# Patient Record
Sex: MALE | Race: WHITE | NOT HISPANIC OR LATINO | Employment: FULL TIME | URBAN - METROPOLITAN AREA
[De-identification: names, ages, dates, MRNs, and addresses within clinical notes are randomized per-mention and may not be internally consistent; named-entity substitution may affect disease eponyms.]

---

## 2019-12-10 ENCOUNTER — ANCILLARY PROCEDURE (OUTPATIENT)
Dept: GENERAL RADIOLOGY | Facility: CLINIC | Age: 44
End: 2019-12-10
Attending: PHYSICIAN ASSISTANT
Payer: COMMERCIAL

## 2019-12-10 ENCOUNTER — OFFICE VISIT (OUTPATIENT)
Dept: FAMILY MEDICINE | Facility: CLINIC | Age: 44
End: 2019-12-10
Payer: COMMERCIAL

## 2019-12-10 VITALS
DIASTOLIC BLOOD PRESSURE: 84 MMHG | SYSTOLIC BLOOD PRESSURE: 134 MMHG | HEIGHT: 71 IN | HEART RATE: 72 BPM | TEMPERATURE: 97.3 F | WEIGHT: 293 LBS | RESPIRATION RATE: 16 BRPM | BODY MASS INDEX: 41.02 KG/M2 | OXYGEN SATURATION: 99 %

## 2019-12-10 DIAGNOSIS — M25.511 RIGHT SHOULDER PAIN, UNSPECIFIED CHRONICITY: ICD-10-CM

## 2019-12-10 DIAGNOSIS — Z30.09 VASECTOMY EVALUATION: Primary | ICD-10-CM

## 2019-12-10 PROCEDURE — 73030 X-RAY EXAM OF SHOULDER: CPT | Mod: RT

## 2019-12-10 PROCEDURE — 99203 OFFICE O/P NEW LOW 30 MIN: CPT | Performed by: PHYSICIAN ASSISTANT

## 2019-12-10 ASSESSMENT — MIFFLIN-ST. JEOR: SCORE: 2227.2

## 2019-12-20 ENCOUNTER — THERAPY VISIT (OUTPATIENT)
Dept: PHYSICAL THERAPY | Facility: CLINIC | Age: 44
End: 2019-12-20
Attending: PHYSICIAN ASSISTANT
Payer: COMMERCIAL

## 2019-12-20 DIAGNOSIS — G89.29 CHRONIC RIGHT SHOULDER PAIN: ICD-10-CM

## 2019-12-20 DIAGNOSIS — M25.511 RIGHT SHOULDER PAIN, UNSPECIFIED CHRONICITY: ICD-10-CM

## 2019-12-20 DIAGNOSIS — M25.511 CHRONIC RIGHT SHOULDER PAIN: ICD-10-CM

## 2019-12-20 DIAGNOSIS — M54.12 CERVICAL RADICULOPATHY: ICD-10-CM

## 2019-12-20 PROCEDURE — 97161 PT EVAL LOW COMPLEX 20 MIN: CPT | Mod: GP | Performed by: PHYSICAL THERAPIST

## 2019-12-20 PROCEDURE — 97110 THERAPEUTIC EXERCISES: CPT | Mod: GP | Performed by: PHYSICAL THERAPIST

## 2019-12-20 NOTE — PROGRESS NOTES
[facility] Initial Evaluation  Subjective:  The history is provided by the patient. No  was used.   Gustavo Capps being seen for R shoulder.   Problem began 12/10/2019 (date of referral). Where condition occurred: for unknown reasons.Problem occurred: unknown  and reported as 2/10 on pain scale. General health as reported by patient is good. Pertinent medical history includes:  Overweight, numbness/tingling and sleep disorder/apnea.   Other medical allergies details: penicillin.  Surgeries include:  None.  Current medications:  None.   Primary job tasks include:  Prolonged sitting and computer work.  Pain is described as sharp and is intermittent. Pain is the same all the time. Since onset symptoms are unchanged. Special tests:  X-ray (mild AC DJD).     Patient is . Restrictions include:  Working in normal job without restrictions.    Barriers include:  None as reported by patient.  Red flags:  None as reported by patient.  Type of problem:  Right shoulder   Condition occurred with:  Unknown cause. This is a chronic condition   Problem details: Over a year ago started getting worsening shoulder pain and not sure why.  No injury or change in activity.   Patient reports pain:  Lateral and anterior. Radiates to:  Upper arm. Associated symptoms:  Loss of strength. Symptoms are exacerbated by lifting and certain positions and relieved by rest.                      Objective:  Standing Alignment:    Cervical/Thoracic:  Forward head  Shoulder/UE:  Rounded shoulders                                  Cervical/Thoracic Evaluation  Cervical AROM: normal                       Cervical Stability/Joint Clearing:  Stability/joint clearing spine: (+) Spurling B to (R) for tingling to hand.               Shoulder Evaluation:  ROM:  AROM:    Flexion:  Left:  160    Right:  155    Abduction:  Left: 160   Right:  155      External Rotation:  Left:  80    Right:   80            Extension/Internal Rotation:  Left:  T8    Right:  T10              Special Tests:      Right shoulder positive for the following special tests:Impingement (Neer)  Palpation:      Left shoulder tenderness not present at: Supraspinatus; Infraspinatus; Teres Minor; Subscapularis or Bicipital Groove  Right shoulder tenderness present at: Supraspinatus; Infraspinatus and Bicipital Groove  Right shoulder tenderness not present at:Teres Minor or Subscapularis                                     General     ROS    Assessment/Plan:    Patient is a 44 year old male with cervical and right shoulder complaints.    Patient has the following significant findings with corresponding treatment plan.                Diagnosis 1:  Cervical radiculopathy  Pain -  manual therapy, self management, education and home program  Impaired posture - neuro re-education and home program  Diagnosis 2:  Shoulder pain/impingement   Pain -  manual therapy, self management, education and home program  Decreased ROM/flexibility - manual therapy, therapeutic exercise and home program  Decreased strength - therapeutic exercise, therapeutic activities and home program  Impaired posture - neuro re-education and home program    Cumulative Therapy Evaluation is: Low complexity.    Previous and current functional limitations:  (See Goal Flow Sheet for this information)    Short term and Long term goals: (See Goal Flow Sheet for this information)     Communication ability:  Patient appears to be able to clearly communicate and understand verbal and written communication and follow directions correctly.  Treatment Explanation - The following has been discussed with the patient:   RX ordered/plan of care  Anticipated outcomes  Possible risks and side effects  This patient would benefit from PT intervention to resume normal activities.   Rehab potential is good.    Frequency:  2 X a month, once daily  Duration:  for 2 months  Discharge Plan:   Achieve all LTG.  Independent in home treatment program.  Reach maximal therapeutic benefit.    Please refer to the daily flowsheet for treatment today, total treatment time and time spent performing 1:1 timed codes.

## 2020-03-09 PROBLEM — G89.29 CHRONIC RIGHT SHOULDER PAIN: Status: RESOLVED | Noted: 2019-12-20 | Resolved: 2020-03-09

## 2020-03-09 PROBLEM — M54.12 CERVICAL RADICULOPATHY: Status: RESOLVED | Noted: 2019-12-20 | Resolved: 2020-03-09

## 2020-03-09 PROBLEM — M25.511 CHRONIC RIGHT SHOULDER PAIN: Status: RESOLVED | Noted: 2019-12-20 | Resolved: 2020-03-09

## 2020-07-31 ENCOUNTER — TELEPHONE (OUTPATIENT)
Dept: UROLOGY | Facility: CLINIC | Age: 45
End: 2020-07-31

## 2020-07-31 NOTE — TELEPHONE ENCOUNTER
Reason for call:  Other   Patient called regarding (reason for call): appointment  Additional comments: Please call to discuss upcoming appointment for vasectomy consult. Thank you.    Phone number to reach patient:  Cell number on file:    Telephone Information:   Mobile [phone]       Best Time:  any    Can we leave a detailed message on this number?  YES    Travel screening: Not Applicable

## 2020-08-14 ENCOUNTER — OFFICE VISIT (OUTPATIENT)
Dept: UROLOGY | Facility: CLINIC | Age: 45
End: 2020-08-14
Payer: COMMERCIAL

## 2020-08-14 VITALS — HEART RATE: 87 BPM | OXYGEN SATURATION: 96 % | DIASTOLIC BLOOD PRESSURE: 75 MMHG | SYSTOLIC BLOOD PRESSURE: 112 MMHG

## 2020-08-14 DIAGNOSIS — Z30.09 VASECTOMY EVALUATION: Primary | ICD-10-CM

## 2020-08-14 PROCEDURE — 99202 OFFICE O/P NEW SF 15 MIN: CPT | Performed by: UROLOGY

## 2020-08-14 NOTE — PROGRESS NOTES
Vasectomy Consult    Reason for visit:   Discuss as a method of birth control/sterilization per Slava Arias's request for consultation  He is interested in vasectomy scrotally.  Patient has 2 children and desires sterilization.  Does not want to use condom or having partners on birth control pills.  He has no erections problem.  He has no urinary complaints.    No current outpatient medications on file.     Allergies   Allergen Reactions     Penicillins Anaphylaxis     PN: LW Reaction: Rash, Generalized       No past medical history on file.  No past surgical history on file.   No family history on file.  Social History     Socioeconomic History     Marital status:      Spouse name: None     Number of children: None     Years of education: None     Highest education level: None   Occupational History     None   Social Needs     Financial resource strain: None     Food insecurity     Worry: None     Inability: None     Transportation needs     Medical: None     Non-medical: None   Tobacco Use     Smoking status: Never Smoker     Smokeless tobacco: Never Used   Substance and Sexual Activity     Alcohol use: Yes     Drug use: Not Currently     Sexual activity: Yes     Partners: Female   Lifestyle     Physical activity     Days per week: None     Minutes per session: None     Stress: None   Relationships     Social connections     Talks on phone: None     Gets together: None     Attends [employer] service: None     Active member of club or organization: None     Attends meetings of clubs or organizations: None     Relationship status: None     Intimate partner violence     Fear of current or ex partner: None     Emotionally abused: None     Physically abused: None     Forced sexual activity: None   Other Topics Concern     None   Social History Narrative     None       REVIEW OF SYSTEMS  =================  C: NEGATIVE for fever, chills, change in weight  I: NEGATIVE for worrisome rashes, moles or lesions  E/M:  NEGATIVE for ear, mouth and throat problems  R: NEGATIVE for significant cough or SHORTNESS OF BREATH  CV:  NEGATIVE for chest pain, palpitations or peripheral edema  GI: NEGATIVE for nausea, abdominal pain, heartburn, or change in bowel habits  NEURO: NEGATIVE numbness/weakness  : see HPI  PSYCH: NEGATIVE depression/anxiety  LYmph: no new enlarged lymph nodes  Ortho: no new trauma/movements      Physical Exam:  /75 (BP Location: Right arm, Patient Position: Chair, Cuff Size: Adult Large)   Pulse 87   SpO2 96%    Patient is pleasant, in no acute distress, good general condition.  HEENT:  Normalcephalic, atraumatic  Lung: no evidence of respiratory distress    Abdomen: Soft, nondistended, non tender. No masses. No rebound or guarding.   Exam: penis no d/c testis no masses bilateral vas palpated no scrotal lesion  Skin: Warm and dry.  No redness.  Neuro: grossly normal  Psych normal mood and affect  Musculoskeletal  moving all extremities        Discussed    That vasectomy is permanent method of birth control.    That vasectomy can fail due to recanalization of the vas even many months/years later.    That he needs 2 negative sperm checks to be considered sterile    That there are other methods that are not permanent and also that the sperm can be frozen for later use.    How the technique is performed, risks of infection, bleeding, damage to the testes vessels and testes atrophy    Long term complication such as chronic and difficult to treat testes pain and questionable increase incidence of prostate cancer    That the procedure can be done at the clinic or hospital OR        Plan:    Stop Aspirin  Will schedule Vasectomy in the future

## 2020-08-14 NOTE — PATIENT INSTRUCTIONS
Your Vasectomy is scheduled 10/6/2020 at 8:00.  Please call [phone] if you need to reschedule this appointment or if you have any question.     Preparation for Vasectomy:  Shave the hair away from the base of your penis and around your testicles.  Wear snug underwear the day of the vasectomy to support your testicles.  Do not take aspirin, ibuprofen, advil, motrin, aleve products one week prior to your vasectomy.      General Vasectomy Information    Vasectomy is a surgery.  If it is successful, it will be impossible for you to ever father children.  The following information regards the male sterilization done by an operation called a vasectomy.  This is done in the physician's office.    The operation done to sterilize the male is easier, safer and much less expensive than the operation done to sterilize the woman.    Sterilization should be considered permanent.  For most males, once the operation is done, it can never me undone.  There are attempts made occasionally to reconnect the tubes that have been cut during the procedure, but this is very difficult and expensive and works only about 50-70% of the time.  In order for any of the physicians in our clinic to do a vasectomy, we require that you consider this a permanent form a sterilization.    A vasectomy can be done at any time, but it is best to think about having it done when you can take at least one day off from work and any excess activities.    Your decision to have a vasectomy should only be made with the following facts clear in mind.    1. First, a vasectomy is only one of several means of birth control.  Many form of temporary contraception are available.  If you have any questions about other methods, please discuss this with your physician.    2. A vasectomy may be unsuccessful in approximately one out of 1000 couples per year.  This occurs when the tubes which are cut during the procedure reconnect themselves.  Sterility cannot be  guaranteed.    3. You should be aware that it is the current belief of the medical profession that a vasectomy procedure does not alter a male physically, physiologically or sexually.  Because each person is a unique individual, there is always the possibility of an adverse phychiatric reaction.  This can be best avoided by being very comfortable in your own mind that you want to have this done, and that you do not want to father any children in the future.  If this is not clear in your mind, this should be further discussed with your physician.    4. You will not notice a change in the volume of your ejaculate since sperm is a very small amount of the semen and it is only the sperm that is stopped from entering the ejaculate after a vasectomy.  Your prostate and seminal vesicle glands really supply most of the semen and this is not at all decreased after a vasectomy.  Also there is no effect on the male hormones.    5. You do not become sterile immediately following a vasectomy due to the fact that there is still sperm remaining in your system that must be eliminated by ejaculation.  For this reason, your sexual partner could still become pregnant for a period of time following the vasectomy operation.  It is necessary that contraceptive measures be used until you receive confirmation from your physician that you are sterile.  It takes approximately 12 ejaculations to clear the semen of sperm, but this can differ in different men.  For this reason, it is very important that your semen be checked for sperm before you are considered sterile, and this should be done approximately 12 weeks after your vasectomy.      6. Vasectomy has risks and benefits.  Among the risks are possible complications resulting from the procedure.  These risks include but are not limited to:   A.  Bleeding, infection, or hematoma occuring during or in the recovery period from the procedure.   B.  Sperm granuloma or a small pea to walnut  sized lump which is a collection of scar tissue and sperm in your scrotal sack and remains permanently   C.  There may be an increased risk of prostate cancer although the data is   unclear.

## 2020-10-06 ENCOUNTER — OFFICE VISIT (OUTPATIENT)
Dept: UROLOGY | Facility: CLINIC | Age: 45
End: 2020-10-06
Payer: COMMERCIAL

## 2020-10-06 DIAGNOSIS — Z30.2 ENCOUNTER FOR VASECTOMY: Primary | ICD-10-CM

## 2020-10-06 DIAGNOSIS — N47.5 PENILE ADHESION: ICD-10-CM

## 2020-10-06 PROCEDURE — 99000 SPECIMEN HANDLING OFFICE-LAB: CPT | Performed by: UROLOGY

## 2020-10-06 PROCEDURE — 54162 LYSIS PENIL CIRCUMIC LESION: CPT | Mod: 51 | Performed by: UROLOGY

## 2020-10-06 PROCEDURE — 88302 TISSUE EXAM BY PATHOLOGIST: CPT | Performed by: PATHOLOGY

## 2020-10-06 PROCEDURE — 55250 REMOVAL OF SPERM DUCT(S): CPT | Performed by: UROLOGY

## 2020-10-06 NOTE — PROGRESS NOTES
Patient returns to clinic for vasectomy.  After informed consent has been obtained, he was placed supine in the OR table.  He was draped and prepped in usual surgical fashion.  2% lidocaine used for local anesthetics.  A scalpel less technique was used.  A small nick was made in the skin after vas identified/clamped.  Surrounding tissue was  from vas.  A small portion of vas was excised.  Lumen of vas burned.  Vas tied with silk sutures after proximal portion closed.  3.0 chromic suture to close skin opening.  This was again repeated on the other side.  Patient tolerated the procedure well.  Post vas instructions given to patient today.    Patient had 2 penile adhesions which were released with sharp excision also.

## 2020-10-08 LAB — COPATH REPORT: NORMAL

## 2021-01-07 DIAGNOSIS — Z30.2 ENCOUNTER FOR VASECTOMY: ICD-10-CM

## 2021-01-07 LAB — SPERM P VAS SMN QL MICRO: NORMAL

## 2021-01-07 PROCEDURE — 89321 SEMEN ANAL SPERM DETECTION: CPT | Performed by: UROLOGY

## 2021-01-10 ENCOUNTER — HEALTH MAINTENANCE LETTER (OUTPATIENT)
Age: 46
End: 2021-01-10

## 2021-10-23 ENCOUNTER — HEALTH MAINTENANCE LETTER (OUTPATIENT)
Age: 46
End: 2021-10-23

## 2022-01-14 ENCOUNTER — IMMUNIZATION (OUTPATIENT)
Dept: NURSING | Facility: CLINIC | Age: 47
End: 2022-01-14
Payer: COMMERCIAL

## 2022-01-14 PROCEDURE — 0054A COVID-19,PF,PFIZER (12+ YRS): CPT

## 2022-01-14 PROCEDURE — 99207 PR NO CHARGE NURSE ONLY: CPT

## 2022-01-14 PROCEDURE — 91305 COVID-19,PF,PFIZER (12+ YRS): CPT

## 2022-02-12 ENCOUNTER — HEALTH MAINTENANCE LETTER (OUTPATIENT)
Age: 47
End: 2022-02-12

## 2022-08-25 NOTE — PROGRESS NOTES
AVS reviewed with patient. Verbalized understanding. AVS was printed and given to patient.       Simi Deluna, KLAUS  08/25/22 1191 SUBJECTIVE:  The patient presents for discussion of vasectomy.  The procedure was explained in detail using diagram from the vasectomy pamphlet published by [facility].  The permanency and effactiveness of this operation were explained in detail, including casectomy failure rate, bleeding, infection, scarring, chronic pain and epididymititis.  The patient was told to stay at bedrest for 48-72 hours, no heavy lifting for one week and to refrain from sex for 1 week.  The patient was also told to have a post operative sperm count at 3 months.  He should have another birth control until he has a sample that is infertile.      We next discussed the risks of vasectomy. The risks discussed included:    -Bleeding at the time of the procedure or later including hematoma development.  -Infection  -Postoperative discomfort  -Development of a sperm granuloma which is a lump that can form at the site of the transection of the vas deferens.  -Sperm buildup in the testicles that may cause a sensation of congestion or discomfort. which is usually responsive to a non steroidal anti-inflammatory drug.  -Epididymitis can also occur and can cause scrotal discomfort.  -Reconnection of the vas deferans which can occur in up to 1 in 1000 cases per the literature.  -Development of sperm antibodies which may leave a man infertile despite having a reversal of the vasectomy later.  - Long term testicular discomfort which is very rare.      Also right shoulder pain for over a year pain off and on pain with range of motion.  Occasional numbness and tingling down his arms.  He states when it does hurt it is painful range of motion and decreased range of motion.  He denies any neck pain.  He denies any injuries.  Not really been do anything for treatment.  Occasionally clicking in the shoulder.    OBJECTIVE:  On exam, this is a well-developed, well-nourished white male.  Weight is 293 lbs 0 oz.  Height is   Ht Readings from Last 2  "Encounters:   12/10/19 1.797 m (5' 10.75\")       Exam reveals a normal circumcised penis, no lesions.  Testes are descended bilaterally. Both vas deferens were NOT easily identified by palpation.  No abnormalities were noted. Exam was limited to the genitalia    Right shoulder exam shows full range of motion.  He has a negative Neer's Sheikh and empty can test.  He has 5-5 strength with internal and external rotation.  He has a negative Stockbridge's test.  Mild crepitation with range of motion.  Slight asymmetry of his right scapula compared to the left with range of motion.    ASSESSMENT:  Vasectomy evaluation  Right shoulder pain.     X-ray right shoulder: neg. pending radiology   PLAN:  1.  At this time I was not easily able to palpate his vas deferens so I will refer him to urology for second opinion and to consider doing his vasectomy.    2.  At this point I think he is got some impingement of shoulder may be a small rotator cuff tear.  We will set him up with physical therapy.  If is not improving the next couple months we will consider doing an MRI    Slava Arias PA-C    "

## 2022-10-10 ENCOUNTER — HEALTH MAINTENANCE LETTER (OUTPATIENT)
Age: 47
End: 2022-10-10

## 2023-02-18 ENCOUNTER — HEALTH MAINTENANCE LETTER (OUTPATIENT)
Age: 48
End: 2023-02-18

## 2024-03-16 ENCOUNTER — HEALTH MAINTENANCE LETTER (OUTPATIENT)
Age: 49
End: 2024-03-16

## 2024-08-19 ENCOUNTER — TELEPHONE (OUTPATIENT)
Dept: FAMILY MEDICINE | Facility: CLINIC | Age: 49
End: 2024-08-19

## 2024-08-19 NOTE — TELEPHONE ENCOUNTER
Pt and wife calling. Pt was seen in ER for a nick on his toe from a . Stitches were placed. When pt got up this morning and changed dressing there was more bleeding than they expected.  Pt did not stay off feet last night after ER visit and did move around a lot during sleep.  Area is not currently bleeding. They deny any signs of infection. They dressed area again and are using an antibiotic ointment on area.      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  Wife asking if he should stay home from work today.  Advised I would give area a few hours to make sure it does not actively bleed again and then ok to go to work or if he can protect area ok to go to work.      They have no further questions.  Estella Salazar BSN, RN

## 2024-10-04 ENCOUNTER — ANCILLARY PROCEDURE (OUTPATIENT)
Dept: ULTRASOUND IMAGING | Facility: OTHER | Age: 49
End: 2024-10-04
Attending: PHYSICIAN ASSISTANT
Payer: COMMERCIAL

## 2024-10-04 ENCOUNTER — OFFICE VISIT (OUTPATIENT)
Dept: FAMILY MEDICINE | Facility: CLINIC | Age: 49
End: 2024-10-04
Payer: COMMERCIAL

## 2024-10-04 VITALS
TEMPERATURE: 96 F | HEIGHT: 71 IN | OXYGEN SATURATION: 98 % | WEIGHT: 234 LBS | DIASTOLIC BLOOD PRESSURE: 88 MMHG | HEART RATE: 61 BPM | SYSTOLIC BLOOD PRESSURE: 128 MMHG | BODY MASS INDEX: 32.76 KG/M2 | RESPIRATION RATE: 14 BRPM

## 2024-10-04 DIAGNOSIS — N50.89 MASS OF LEFT TESTICLE: ICD-10-CM

## 2024-10-04 DIAGNOSIS — N50.89 MASS OF LEFT TESTICLE: Primary | ICD-10-CM

## 2024-10-04 PROCEDURE — 76870 US EXAM SCROTUM: CPT | Mod: TC | Performed by: RADIOLOGY

## 2024-10-04 PROCEDURE — 93976 VASCULAR STUDY: CPT | Mod: TC | Performed by: RADIOLOGY

## 2024-10-04 PROCEDURE — 99213 OFFICE O/P EST LOW 20 MIN: CPT | Performed by: PHYSICIAN ASSISTANT

## 2024-10-04 ASSESSMENT — PAIN SCALES - GENERAL: PAINLEVEL: NO PAIN (0)

## 2024-10-04 NOTE — PROGRESS NOTES
"  Assessment & Plan       ICD-10-CM    1. Mass of left testicle  N50.89 US Testicular & Scrotum w Doppler Ltd      Talk to patient about his concerns.  Unclear etiology at this time we will get an ultrasound updated today and follow-up with depend on those results.  Warning signs were discussed.    Patient was able to get ultrasound done today.  He was found to be an epididymal cyst.  Patient was informed of these results and benign etiology.  Warning signs discussed follow-up as needed.    Rocky Chen is a 49 year old, presenting for the following health issues:  Mass        10/4/2024     6:56 AM   Additional Questions   Roomed by elmer   Accompanied by self         10/4/2024     6:56 AM   Patient Reported Additional Medications   Patient reports taking the following new medications no     History of Present Illness       Reason for visit:  Lump found on testical  Symptom onset:  3-7 days ago  Symptoms include:  Lump  Symptom intensity:  Mild  Symptom progression:  Staying the same  Had these symptoms before:  No  What makes it worse:  No  What makes it better:  No   He is taking medications regularly.     Patient is here today for evaluation of a left testicular lump he is followed in the last week.  He denies any pain or trauma he denies any dysuria or hematuria.  He did denies any recent colds coughs or fevers abdominal pains.  He denies any weight changes.  Never had anything like this before.    Post vas ectomy in 2020 with Dr. Fay.    Review of Systems  Constitutional, HEENT, cardiovascular, pulmonary, gi and gu systems are negative, except as otherwise noted.      Objective    /88   Pulse 61   Temp (!) 96  F (35.6  C) (Tympanic)   Resp 14   Ht 1.791 m (5' 10.5\")   Wt 106.1 kg (234 lb)   SpO2 98%   BMI 33.10 kg/m    Body mass index is 33.1 kg/m .  Physical Exam   GENERAL: alert and no distress  RESP: lungs clear to auscultation - no rales, rhonchi or wheezes  CV: regular rate and rhythm, " normal S1 S2, no S3 or S4, no murmur, click or rub, no peripheral edema  ABDOMEN: soft, nontender, no hepatosplenomegaly, no masses and bowel sounds normal   (male): normal male genitalia without lesions or urethral discharge, no hernia his left testicle appears to have firm at least 1 cm circular nodule type lesion.  It is nontender.  MS: no gross musculoskeletal defects noted, no edema    Results for orders placed or performed in visit on 10/04/24   US Testicular & Scrotum w Doppler Ltd     Status: None (Preliminary result)    Narrative    ULTRASOUND TESTICULAR AND SCROTUM WITH DOPPLER LIMITED October 4, 2024  8:49 AM    CLINICAL HISTORY: Mass of left testicle.    TECHNIQUE: Ultrasound of scrotum with color flow and spectral Doppler  with waveform analysis performed.    COMPARISON: None.    FINDINGS:    RIGHT: Right testicle measures 4.1 x 3.3 x 2.5 cm. Normal testicle  with no masses. Normal arterial duplex and normal color flow. Tiny  hypoechoic cysts at the epididymal tail measuring 10 x 10 x 9 mm. No  hydrocele. No varicocele.    LEFT: Left testicle measures 4.3 x 2.9 x 2.6 cm. Normal testicle with  no masses. Normal arterial duplex and normal color flow. Small  hypoechoic cyst at the epididymal head measuring 19 x 19 x 17 mm,  corresponding to the reported lump. No hydrocele. No varicocele.      Impression    IMPRESSION:  1.  No sonographic evidence of testicular torsion or mass.  2.  Left epididymal head cyst, accounting for reported lump.  3.  Right epididymal tail cyst.               Signed Electronically by: Slava Arias PA-C

## 2025-03-22 ENCOUNTER — HEALTH MAINTENANCE LETTER (OUTPATIENT)
Age: 50
End: 2025-03-22